# Patient Record
(demographics unavailable — no encounter records)

---

## 2025-05-13 NOTE — HISTORY OF PRESENT ILLNESS
[FreeTextEntry8] : -PMH: HTN, HLD, Anxiety, Constipation -SH: . 3 children. Former smoker (Quit 1990). Occasional EtOH use.  JC is a 63 year F whom is here today for left shoulder pain but that has now resolved.  She is also here to address her known history of hypertension, hyperlipidemia Reports feeling well and denies any changes since time of last visit Since time of last visit patient consulted with cardiology but never obtained recommended imaging or followed up in office never started recommended crestor

## 2025-05-13 NOTE — ASSESSMENT
[FreeTextEntry1] : Normal vital signs and physical exam in office today aside from elevated BMI for which dietary/lifestyle modifications and goal of weight loss is encouraged Patient has been noncompliant and recommended follow-up noncompliant with meds states she has been taking losartan 25mg qd but i do not know who has been prescribing it  Follow-up labs drawn in office today with further recommendations to come pending results Recommend patient return to office in 1 month

## 2025-05-13 NOTE — ADDENDUM
[FreeTextEntry1] : Labs all good aside from elevated A1c 5.7 consistent with prediabetes, elevated total and LDL cholesterol for which dietary/lifestyle modifications with increased exercise and goal of weight loss is encouraged.  Mediterranean type diet preferred.  Recommend patient return to office within 3 months for an annual physical which she is overdue for sooner if needed